# Patient Record
Sex: FEMALE | Race: WHITE | NOT HISPANIC OR LATINO | URBAN - METROPOLITAN AREA
[De-identification: names, ages, dates, MRNs, and addresses within clinical notes are randomized per-mention and may not be internally consistent; named-entity substitution may affect disease eponyms.]

---

## 2018-11-27 ENCOUNTER — ANESTHESIA - HEALTHEAST (OUTPATIENT)
Dept: SURGERY | Facility: AMBULATORY SURGERY CENTER | Age: 63
End: 2018-11-27

## 2018-11-27 ASSESSMENT — MIFFLIN-ST. JEOR: SCORE: 1238.96

## 2018-11-28 ENCOUNTER — SURGERY - HEALTHEAST (OUTPATIENT)
Dept: SURGERY | Facility: AMBULATORY SURGERY CENTER | Age: 63
End: 2018-11-28

## 2018-11-28 ASSESSMENT — MIFFLIN-ST. JEOR: SCORE: 1238.96

## 2021-05-31 ENCOUNTER — RECORDS - HEALTHEAST (OUTPATIENT)
Dept: ADMINISTRATIVE | Facility: CLINIC | Age: 66
End: 2021-05-31

## 2021-06-02 VITALS — WEIGHT: 155 LBS | BODY MASS INDEX: 25.83 KG/M2 | HEIGHT: 65 IN

## 2021-06-22 NOTE — ANESTHESIA POSTPROCEDURE EVALUATION
Patient: June F Gerson  COLONOSCOPY  Anesthesia type: MAC    Patient location: Phase II Recovery  Last vitals:   Vitals:    11/28/18 1038   BP: 139/77   Pulse:    Resp:    Temp:    SpO2:      Post vital signs: stable  Level of consciousness: awake and responds to simple questions  Post-anesthesia pain: pain controlled  Post-anesthesia nausea and vomiting: no  Pulmonary: unassisted, return to baseline  Cardiovascular: stable and blood pressure at baseline  Hydration: adequate  Anesthetic events: no    QCDR Measures:  ASA# 11 - Myra-op Cardiac Arrest: ASA11B - Patient did NOT experience unanticipated cardiac arrest  ASA# 12 - Myra-op Mortality Rate: ASA12B - Patient did NOT die  ASA# 13 - PACU Re-Intubation Rate: ASA13B - Patient did NOT require a new airway mgmt  ASA# 10 - Composite Anes Safety: ASA10A - No serious adverse event    Additional Notes:

## 2021-06-22 NOTE — ANESTHESIA CARE TRANSFER NOTE
Last vitals:   Vitals:    11/28/18 1003   BP: 105/59   Pulse: 63   Resp: 16   Temp: 37  C (98.6  F)   SpO2: 100%     Patient's level of consciousness is drowsy  Spontaneous respirations: yes  Maintains airway independently: yes  Dentition unchanged: yes  Oropharynx: oropharynx clear of all foreign objects    QCDR Measures:  ASA# 20 - Surgical Safety Checklist: WHO surgical safety checklist completed prior to induction    PQRS# 430 - Adult PONV Prevention: 4558F - Pt received => 2 anti-emetic agents (different classes) preop & intraop  ASA# 8 - Peds PONV Prevention: NA - Not pediatric patient, not GA or 2 or more risk factors NOT present  PQRS# 424 - Myra-op Temp Management: 4559F - At least one body temp DOCUMENTED => 35.5C or 95.9F within required timeframe  PQRS# 426 - PACU Transfer Protocol: - Transfer of care checklist used  ASA# 14 - Acute Post-op Pain: ASA14B - Patient did NOT experience pain >= 7 out of 10

## 2021-06-27 ENCOUNTER — HEALTH MAINTENANCE LETTER (OUTPATIENT)
Age: 66
End: 2021-06-27

## 2021-07-03 NOTE — ANESTHESIA PREPROCEDURE EVALUATION
Anesthesia Preprocedure Evaluation by Myron Cruz MD at 11/28/2018  8:50 AM     Author: Myron Cruz MD Service: -- Author Type: Physician    Filed: 11/28/2018  8:51 AM Date of Service: 11/28/2018  8:50 AM Status: Signed    : Myorn Cruz MD (Physician)       Anesthesia Evaluation      Patient summary reviewed   History of anesthetic complications (woke up trying to catch her breath)     Airway   Mallampati: II   Pulmonary - negative ROS and normal exam                          Cardiovascular - normal exam  Exercise tolerance: > or = 4 METS  (+) hypertension well controlled, , hypercholesterolemia,     Rhythm: regular  Rate: normal,         Neuro/Psych - negative ROS     Endo/Other    (+) hypothyroidism,      GI/Hepatic/Renal - negative ROS     Comments: diverticulitis          Dental    (+) bridge                           Anesthesia Plan  Planned anesthetic: MAC  Versed/fent  propofol ggt  Decadron/zofran  ASA 2     Anesthetic plan and risks discussed with: patient and spouse    Post-op plan: routine recovery

## 2021-10-17 ENCOUNTER — HEALTH MAINTENANCE LETTER (OUTPATIENT)
Age: 66
End: 2021-10-17

## 2022-07-23 ENCOUNTER — HEALTH MAINTENANCE LETTER (OUTPATIENT)
Age: 67
End: 2022-07-23

## 2022-10-01 ENCOUNTER — HEALTH MAINTENANCE LETTER (OUTPATIENT)
Age: 67
End: 2022-10-01

## 2023-08-12 ENCOUNTER — HEALTH MAINTENANCE LETTER (OUTPATIENT)
Age: 68
End: 2023-08-12

## 2023-11-28 ENCOUNTER — ANESTHESIA EVENT (OUTPATIENT)
Dept: SURGERY | Facility: AMBULATORY SURGERY CENTER | Age: 68
End: 2023-11-28
Payer: COMMERCIAL

## 2023-11-29 ENCOUNTER — HOSPITAL ENCOUNTER (OUTPATIENT)
Facility: AMBULATORY SURGERY CENTER | Age: 68
Discharge: HOME | End: 2023-11-29
Attending: COLON & RECTAL SURGERY
Payer: COMMERCIAL

## 2023-11-29 ENCOUNTER — ANESTHESIA (OUTPATIENT)
Dept: SURGERY | Facility: AMBULATORY SURGERY CENTER | Age: 68
End: 2023-11-29
Payer: COMMERCIAL

## 2023-11-29 VITALS
OXYGEN SATURATION: 98 % | DIASTOLIC BLOOD PRESSURE: 72 MMHG | TEMPERATURE: 96.9 F | SYSTOLIC BLOOD PRESSURE: 126 MMHG | RESPIRATION RATE: 14 BRPM | HEART RATE: 84 BPM

## 2023-11-29 LAB — COLONOSCOPY: NORMAL

## 2023-11-29 RX ORDER — OXYCODONE HYDROCHLORIDE 10 MG/1
10 TABLET ORAL
Status: DISCONTINUED | OUTPATIENT
Start: 2023-11-29 | End: 2023-11-30 | Stop reason: HOSPADM

## 2023-11-29 RX ORDER — PROPOFOL 10 MG/ML
INJECTION, EMULSION INTRAVENOUS PRN
Status: DISCONTINUED | OUTPATIENT
Start: 2023-11-29 | End: 2023-11-29

## 2023-11-29 RX ORDER — FENTANYL CITRATE 0.05 MG/ML
25 INJECTION, SOLUTION INTRAMUSCULAR; INTRAVENOUS
Status: DISCONTINUED | OUTPATIENT
Start: 2023-11-29 | End: 2023-11-30 | Stop reason: HOSPADM

## 2023-11-29 RX ORDER — ONDANSETRON 4 MG/1
4 TABLET, ORALLY DISINTEGRATING ORAL
Status: CANCELLED | OUTPATIENT
Start: 2023-11-29

## 2023-11-29 RX ORDER — ONDANSETRON 2 MG/ML
4 INJECTION INTRAMUSCULAR; INTRAVENOUS EVERY 30 MIN PRN
Status: DISCONTINUED | OUTPATIENT
Start: 2023-11-29 | End: 2023-11-30 | Stop reason: HOSPADM

## 2023-11-29 RX ORDER — GLYCOPYRROLATE 0.2 MG/ML
INJECTION, SOLUTION INTRAMUSCULAR; INTRAVENOUS PRN
Status: DISCONTINUED | OUTPATIENT
Start: 2023-11-29 | End: 2023-11-29

## 2023-11-29 RX ORDER — ONDANSETRON 4 MG/1
4 TABLET, ORALLY DISINTEGRATING ORAL EVERY 30 MIN PRN
Status: DISCONTINUED | OUTPATIENT
Start: 2023-11-29 | End: 2023-11-30 | Stop reason: HOSPADM

## 2023-11-29 RX ORDER — LIDOCAINE 40 MG/G
CREAM TOPICAL
Status: DISCONTINUED | OUTPATIENT
Start: 2023-11-29 | End: 2023-11-30 | Stop reason: HOSPADM

## 2023-11-29 RX ORDER — PROPOFOL 10 MG/ML
INJECTION, EMULSION INTRAVENOUS CONTINUOUS PRN
Status: DISCONTINUED | OUTPATIENT
Start: 2023-11-29 | End: 2023-11-29

## 2023-11-29 RX ORDER — ACETAMINOPHEN 325 MG/1
975 TABLET ORAL ONCE
Status: DISCONTINUED | OUTPATIENT
Start: 2023-11-29 | End: 2023-11-30 | Stop reason: HOSPADM

## 2023-11-29 RX ORDER — LIDOCAINE HYDROCHLORIDE 20 MG/ML
INJECTION, SOLUTION INFILTRATION; PERINEURAL PRN
Status: DISCONTINUED | OUTPATIENT
Start: 2023-11-29 | End: 2023-11-29

## 2023-11-29 RX ORDER — OXYCODONE HYDROCHLORIDE 5 MG/1
5 TABLET ORAL
Status: DISCONTINUED | OUTPATIENT
Start: 2023-11-29 | End: 2023-11-30 | Stop reason: HOSPADM

## 2023-11-29 RX ORDER — SODIUM CHLORIDE, SODIUM LACTATE, POTASSIUM CHLORIDE, CALCIUM CHLORIDE 600; 310; 30; 20 MG/100ML; MG/100ML; MG/100ML; MG/100ML
INJECTION, SOLUTION INTRAVENOUS CONTINUOUS
Status: DISCONTINUED | OUTPATIENT
Start: 2023-11-29 | End: 2023-11-30 | Stop reason: HOSPADM

## 2023-11-29 RX ADMIN — PROPOFOL 20 MG: 10 INJECTION, EMULSION INTRAVENOUS at 08:11

## 2023-11-29 RX ADMIN — GLYCOPYRROLATE 0.2 MG: 0.2 INJECTION, SOLUTION INTRAMUSCULAR; INTRAVENOUS at 08:09

## 2023-11-29 RX ADMIN — LIDOCAINE HYDROCHLORIDE 50 MG: 20 INJECTION, SOLUTION INFILTRATION; PERINEURAL at 08:09

## 2023-11-29 RX ADMIN — PROPOFOL 200 MCG/KG/MIN: 10 INJECTION, EMULSION INTRAVENOUS at 08:11

## 2023-11-29 RX ADMIN — SODIUM CHLORIDE, SODIUM LACTATE, POTASSIUM CHLORIDE, CALCIUM CHLORIDE: 600; 310; 30; 20 INJECTION, SOLUTION INTRAVENOUS at 07:40

## 2023-11-29 NOTE — ANESTHESIA CARE TRANSFER NOTE
Patient: June F Gerson    Procedure: Procedure(s):  COLONOSCOPY       Diagnosis: Hx of colonic polyps [Z86.010]  Diagnosis Additional Information: No value filed.    Anesthesia Type:   MAC     Note:    Oropharynx: oropharynx clear of all foreign objects  Level of Consciousness: drowsy  Oxygen Supplementation: face mask  Level of Supplemental Oxygen (L/min / FiO2): 8  Independent Airway: airway patency satisfactory and stable  Dentition: dentition unchanged  Vital Signs Stable: post-procedure vital signs reviewed and stable  Report to RN Given: handoff report given  Patient transferred to: Phase II    Handoff Report: Identifed the Patient, Identified the Reponsible Provider, Reviewed the pertinent medical history, Discussed the surgical course, Reviewed Intra-OP anesthesia mangement and issues during anesthesia, Set expectations for post-procedure period and Allowed opportunity for questions and acknowledgement of understanding      Vitals:  Vitals Value Taken Time   /62    Temp 37C    Pulse 78    Resp 14    SpO2 100        Electronically Signed By: AYANNA Valentine CRNA  November 29, 2023  8:32 AM

## 2023-11-29 NOTE — H&P
[facility]   History and Physical       History of Present Illness: 68 year old female w/ hx of sigmoid resection for diverticular disease.     Past Medical History:   Diagnosis Date    Hypertension     Thyroid disease        Allergies   Allergen Reactions    Atenolol Other (See Comments)     Irregular heart beat    Ciprofloxacin Diarrhea     diarrhea    Flagyl [Metronidazole] Nausea and Vomiting    Vytorin 10-10 [Ezetimibe-Simvastatin] Swelling     Tongue swelling and hives    Amoxicillin Nausea and Vomiting     Patient does not remember having this med or a reaction to it    Penicillins Unknown     Reaction in childhood, reaction not known       Past Surgical History:   Procedure Laterality Date    CHOLECYSTECTOMY      COLONOSCOPY N/A 2018    Procedure: COLONOSCOPY;  Surgeon: Jim Carlin MD;  Location: [facility];  Service: General    FOOT SURGERY Right     for bone spur and cyst excision    RECTAL PROLAPSE REPAIR N/A 2015    Procedure: ROBOTIC ASSISTED SIGMOIDCOLECTOMY, INTRAOPERATIVE PROCTOSCOPY;  Surgeon: Jim Carlin II, MD;  Location: [facility] OR;  Service:     SIGMOIDECTOMY  2015    Diverticulitis    TONSILLECTOMY         Family History   Problem Relation Age of Onset    Heart Disease Mother     Leukemia Mother     Dementia Father        Social History     Socioeconomic History    Marital status:      Spouse name: Not on file    Number of children: Not on file    Years of education: Not on file    Highest education level: Not on file   Occupational History    Not on file   Tobacco Use    Smoking status: Former     Types: Cigarettes     Quit date: 1985     Years since quittin.8    Smokeless tobacco: Never   Substance and Sexual Activity    Alcohol use: Yes     Alcohol/week: 4.2 standard drinks of alcohol     Comment: Alcoholic Drinks/day: occasional    Drug use: No    Sexual activity: Not on file   Other Topics Concern    Not on  file   Social History Narrative    Daughter is a PA with colorectal surgery     Social Determinants of Health     Financial Resource Strain: Not on file   Food Insecurity: Not on file   Transportation Needs: Not on file   Physical Activity: Not on file   Stress: Not on file   Social Connections: Not on file   Interpersonal Safety: Not on file   Housing Stability: Not on file       Current Outpatient Medications   Medication    acetaminophen (TYLENOL) 500 MG tablet    aspirin 325 MG tablet    atorvastatin (LIPITOR) 80 MG tablet    calcium carbonate-vitamin D2 500 mg(1,250mg) -200 unit tablet    ezetimibe (ZETIA) 10 mg tablet    FLAXSEED OIL ORAL    ibuprofen (ADVIL,MOTRIN) 600 MG tablet    levothyroxine (SYNTHROID, LEVOTHROID) 100 MCG tablet    losartan (COZAAR) 50 MG tablet    multivitamin with minerals (THERA-M) 9 mg iron-400 mcg Tab tablet    PARoxetine (PAXIL) 20 MG tablet    zolpidem (AMBIEN) 10 mg tablet     Current Facility-Administered Medications   Medication    lactated ringers infusion    lidocaine (LMX4) kit    lidocaine 1 % 0.1-1 mL    sodium chloride (PF) 0.9% PF flush 3 mL    sodium chloride (PF) 0.9% PF flush 3 mL       Review of Systems:   A full 10 point review of systems was taken and is negative aside from what is noted above in the HPI.    Consitutional / General: Denies wt changes, fevers, chills or sweats.  Skin: Denies rashes, bruising, pruritis, or bleeding tendencies.   ENT: Denies trauma, headache, hearing loss, tinnitus, dysphagia  Eyes: Denies double vision  Respiratory: Denies SOB, cough, sputum production or dyspnea on exertion.   Cardiovascular: Denies chest pain, palpitations, orthostatic hypotension  Hematology / Lymph: Denies hx of blood clots or pulmonary embolism  Gastrointestinal:  Denies loss of appetite, dysphagia, N/V, constipation or diarrhea.   Genitourinary: Denies dysuria, frequency, urgency, hesitancy, incontinence, nocturia, hematuria, difficulty starting or stopping  "their stream, hx of stones or hx of uti's.   Neurologic: Denies headache, LOC, memory loss, vertigo, syncope or seizures.   Musculoskeletal: Denies back pain, numbness, tingling or hx of back surgery  Psychological: alert and oriented    Intake/Output past 24 hrs:  No intake or output data in the 24 hours ending 11/29/23 0808    Physical Exam:  Temp:  [99  F (37.2  C)] 99  F (37.2  C)  Pulse:  [72] 72  Resp:  [16] 16  BP: (140)/(84) 140/84  SpO2:  [99 %] 99 %  General: NAD, alert, cooperative  Head: normocephalic, without abnormality / atraumatic  Respiratory: non-labored breathing, CTA-B  Cardiac: RRR +S1/S2  Abdomen: soft, non-tender, non-distended.  No guarding or rebound   Perianal/Rectal: deferred   Skin: no rashes or lesions  Musculoskeletal: moves all four extremities equally; no calf edema or tenderness  Psychological: alert and oriented, answers questions appropriately  Neurological: cranial nerves grossly intact    CBC RESULTS: No results for input(s): \"WBC\", \"RBC\", \"HGB\", \"HCT\", \"MCV\", \"MCH\", \"MCHC\", \"RDW\", \"PLT\" in the last 91576 hours.    Last Comprehensive Metabolic Panel:  No results found for: \"NA\", \"POTASSIUM\", \"CHLORIDE\", \"CO2\", \"ANIONGAP\", \"GLC\", \"BUN\", \"CR\", \"GFRESTIMATED\", \"NI\"    Assessment: Vicki Nieto is a 68 year old female presenting for surveillance colonoscopy.     Plan: Colonoscopy under SHANNAN Wesley MD, BERNIE  [facility]  Office: [phone]  11/29/2023 8:08 AM       "

## 2023-11-29 NOTE — DISCHARGE INSTRUCTIONS
Post-Colonoscopy Discharge Instructions:    1. You have just had an examination of your colon (large intestine) called a colonoscopy. You should have a full report of the findings to take with you today. It will list if any polyps were removed or if any biopsies were taken to send to the laboratory. If we did take out polyps or do biopsies, you should get a letter in the next 1-2 weeks if they are the standard pre-cancerous polyps. If it is anything more serious, your doctor will call you. The timing of your next colonoscopy is determined by your family history of polyps/colon cancer and what findings were on your colonoscopy. [facility] will keep track of when you are due for your next colonoscopy and contact you.     2. No driving or operating heavy power equipment today.    3. Do not drink alcohol for 12 hours after the procedure.     4. Do not sign any important or legally binding papers today.    5. No strenuous activity today (its a great day for a nap and to lay on the couch and watch some TV!).     6. You can eat whatever you like after your procedure.     7. You may experience drowsiness or forgetfulness at first. You may also notice altered  bowel habits, excessive gas and belching or bloated feeling.    8. If recommended by your physician, you should avoid Aspirin, Aspirin products and   Arthritis medications for 7 - 10 days following the exam if biopsies or polypectomies   have been performed.    9. You may develop soreness or redness where your IV medication was given.    Apply warm wet cloths to the area for 15 minutes 3 - 4 times per day.    10. You may have a bloated, gaseous feeling in your abdomen after a  Colonoscopy for the next few hours. Passing gas and belching will help. Walking or lying down on your left side with your knees flexed may relieve the discomfort.    11. Call the office at [phone] right away if you notice any of the following:  a. Vomiting of blood  and/or  coffee ground  material.  b. Rectal bleeding - >1Tbsp, blood clots, or continuous bleeding.  c. Severe abdominal pain.  d. Chills, fever over 101 degrees F.  e. Persistent nausea or vomiting.  f. Persistent disorientation/confusion.  g. Persistent coughing or spitting up blood.  h. Persistent redness, hardness, or tenderness at IV site.

## 2023-11-29 NOTE — ANESTHESIA POSTPROCEDURE EVALUATION
Patient: June F Gerson    Procedure: Procedure(s):  COLONOSCOPY       Anesthesia Type:  MAC    Note:  Disposition: Outpatient   Postop Pain Control: Uneventful            Sign Out: Well controlled pain   PONV: No   Neuro/Psych: Uneventful            Sign Out: Acceptable/Baseline neuro status   Airway/Respiratory: Uneventful            Sign Out: Acceptable/Baseline resp. status   CV/Hemodynamics: Uneventful            Sign Out: Acceptable CV status; No obvious hypovolemia; No obvious fluid overload   Other NRE: NONE   DID A NON-ROUTINE EVENT OCCUR?            Last vitals:  Vitals Value Taken Time   /72 11/29/23 0845   Temp 96.9  F (36.1  C) 11/29/23 0832   Pulse 81 11/29/23 0851   Resp 14 11/29/23 0832   SpO2 98 % 11/29/23 0851   Vitals shown include unfiled device data.    Electronically Signed By: Twyla Ash MD  November 29, 2023  8:54 AM

## 2023-11-29 NOTE — ANESTHESIA PREPROCEDURE EVALUATION
Anesthesia Pre-Procedure Evaluation    Patient: Vicki Nieto   MRN: [id number] : 1955        Procedure : Procedure(s):  COLONOSCOPY          Past Medical History:   Diagnosis Date    Hypertension     Thyroid disease       Past Surgical History:   Procedure Laterality Date    CHOLECYSTECTOMY      COLONOSCOPY N/A 2018    Procedure: COLONOSCOPY;  Surgeon: Jim Carlin MD;  Location: [facility];  Service: General    FOOT SURGERY Right     for bone spur and cyst excision    RECTAL PROLAPSE REPAIR N/A 2015    Procedure: ROBOTIC ASSISTED SIGMOIDCOLECTOMY, INTRAOPERATIVE PROCTOSCOPY;  Surgeon: Jim Carlin II, MD;  Location: [facility];  Service:     SIGMOIDECTOMY  2015    Diverticulitis    TONSILLECTOMY        Allergies   Allergen Reactions    Atenolol Other (See Comments)     Irregular heart beat    Ciprofloxacin Diarrhea     diarrhea    Flagyl [Metronidazole] Nausea and Vomiting    Vytorin 10-10 [Ezetimibe-Simvastatin] Swelling     Tongue swelling and hives    Amoxicillin Nausea and Vomiting     Patient does not remember having this med or a reaction to it    Penicillins Unknown     Reaction in childhood, reaction not known      Social History     Tobacco Use    Smoking status: Former     Types: Cigarettes     Quit date: 1985     Years since quittin.8    Smokeless tobacco: Never   Substance Use Topics    Alcohol use: Yes     Alcohol/week: 4.2 standard drinks of alcohol     Comment: Alcoholic Drinks/day: occasional      Wt Readings from Last 1 Encounters:   18 70.3 kg (155 lb)        Anesthesia Evaluation            ROS/MED HX  ENT/Pulmonary:  - neg pulmonary ROS     Neurologic:  - neg neurologic ROS     Cardiovascular:     (+)  hypertension- -   -  - -                                      METS/Exercise Tolerance:     Hematologic:  - neg hematologic  ROS     Musculoskeletal:  - neg musculoskeletal ROS     GI/Hepatic:  - neg GI/hepatic ROS    "  Renal/Genitourinary:  - neg Renal ROS     Endo:  - neg endo ROS     Psychiatric/Substance Use:  - neg psychiatric ROS     Infectious Disease:       Malignancy:       Other:            Physical Exam    Airway  airway exam normal      Mallampati: I   TM distance: > 3 FB   Neck ROM: full   Mouth opening: > 3 cm    Respiratory Devices and Support         Dental       (+) Minor Abnormalities - some fillings, tiny chips      Cardiovascular   cardiovascular exam normal          Pulmonary   pulmonary exam normal                OUTSIDE LABS:  CBC: No results found for: \"WBC\", \"HGB\", \"HCT\", \"PLT\"  BMP: No results found for: \"NA\", \"POTASSIUM\", \"CHLORIDE\", \"CO2\", \"BUN\", \"CR\", \"GLC\"  COAGS: No results found for: \"PTT\", \"INR\", \"FIBR\"  POC: No results found for: \"BGM\", \"HCG\", \"HCGS\"  HEPATIC: No results found for: \"ALBUMIN\", \"PROTTOTAL\", \"ALT\", \"AST\", \"GGT\", \"ALKPHOS\", \"BILITOTAL\", \"BILIDIRECT\", \"BERONICA\"  OTHER: No results found for: \"PH\", \"LACT\", \"A1C\", \"NI\", \"PHOS\", \"MAG\", \"LIPASE\", \"AMYLASE\", \"TSH\", \"T4\", \"T3\", \"CRP\", \"SED\"    Anesthesia Plan    ASA Status:  2    NPO Status:  NPO Appropriate    Anesthesia Type: MAC.     - Reason for MAC: immobility needed, straight local not clinically adequate              Consents    Anesthesia Plan(s) and associated risks, benefits, and realistic alternatives discussed. Questions answered and patient/representative(s) expressed understanding.     - Discussed:     - Discussed with:  Patient      - Extended Intubation/Ventilatory Support Discussed: No.      - Patient is DNR/DNI Status: No          Postoperative Care    Pain management: Multi-modal analgesia.   PONV prophylaxis: Ondansetron (or other 5HT-3)     Comments:               Twyla Ash MD    I have reviewed the pertinent notes and labs in the chart from the past 30 days and (re)examined the patient.  Any updates or changes from those notes are reflected in this note.             # Drug Induced Platelet Defect: home medication " list includes an antiplatelet medication

## 2024-07-21 ENCOUNTER — HEALTH MAINTENANCE LETTER (OUTPATIENT)
Age: 69
End: 2024-07-21

## 2024-09-29 ENCOUNTER — HEALTH MAINTENANCE LETTER (OUTPATIENT)
Age: 69
End: 2024-09-29